# Patient Record
Sex: FEMALE | Race: WHITE | NOT HISPANIC OR LATINO | URBAN - METROPOLITAN AREA
[De-identification: names, ages, dates, MRNs, and addresses within clinical notes are randomized per-mention and may not be internally consistent; named-entity substitution may affect disease eponyms.]

---

## 2024-10-17 ENCOUNTER — EMERGENCY (EMERGENCY)
Facility: HOSPITAL | Age: 44
LOS: 1 days | Discharge: HOME | End: 2024-10-17
Attending: EMERGENCY MEDICINE | Admitting: EMERGENCY MEDICINE
Payer: COMMERCIAL

## 2024-10-17 VITALS
OXYGEN SATURATION: 100 % | DIASTOLIC BLOOD PRESSURE: 80 MMHG | HEART RATE: 63 BPM | TEMPERATURE: 98 F | SYSTOLIC BLOOD PRESSURE: 117 MMHG | RESPIRATION RATE: 18 BRPM

## 2024-10-17 VITALS
DIASTOLIC BLOOD PRESSURE: 68 MMHG | HEART RATE: 72 BPM | SYSTOLIC BLOOD PRESSURE: 118 MMHG | TEMPERATURE: 98 F | RESPIRATION RATE: 16 BRPM | OXYGEN SATURATION: 97 %

## 2024-10-17 DIAGNOSIS — N12 TUBULO-INTERSTITIAL NEPHRITIS, NOT SPECIFIED AS ACUTE OR CHRONIC: ICD-10-CM

## 2024-10-17 DIAGNOSIS — R30.0 DYSURIA: ICD-10-CM

## 2024-10-17 LAB
ALBUMIN SERPL ELPH-MCNC: 3.6 G/DL — SIGNIFICANT CHANGE UP (ref 3.4–5)
ALP SERPL-CCNC: 74 U/L — SIGNIFICANT CHANGE UP (ref 40–120)
ALT FLD-CCNC: 12 U/L — SIGNIFICANT CHANGE UP (ref 12–42)
ANION GAP SERPL CALC-SCNC: 8 MMOL/L — LOW (ref 9–16)
APPEARANCE UR: ABNORMAL
AST SERPL-CCNC: 13 U/L — LOW (ref 15–37)
BACTERIA # UR AUTO: ABNORMAL /HPF
BASOPHILS # BLD AUTO: 0.03 K/UL — SIGNIFICANT CHANGE UP (ref 0–0.2)
BASOPHILS NFR BLD AUTO: 0.3 % — SIGNIFICANT CHANGE UP (ref 0–2)
BILIRUB SERPL-MCNC: 0.2 MG/DL — SIGNIFICANT CHANGE UP (ref 0.2–1.2)
BILIRUB UR-MCNC: NEGATIVE — SIGNIFICANT CHANGE UP
BUN SERPL-MCNC: 18 MG/DL — SIGNIFICANT CHANGE UP (ref 7–23)
CALCIUM SERPL-MCNC: 9.2 MG/DL — SIGNIFICANT CHANGE UP (ref 8.5–10.5)
CHLORIDE SERPL-SCNC: 106 MMOL/L — SIGNIFICANT CHANGE UP (ref 96–108)
CO2 SERPL-SCNC: 24 MMOL/L — SIGNIFICANT CHANGE UP (ref 22–31)
COLOR SPEC: YELLOW — SIGNIFICANT CHANGE UP
CREAT SERPL-MCNC: 1.1 MG/DL — SIGNIFICANT CHANGE UP (ref 0.5–1.3)
DIFF PNL FLD: ABNORMAL
EGFR: 64 ML/MIN/1.73M2 — SIGNIFICANT CHANGE UP
EOSINOPHIL # BLD AUTO: 0.08 K/UL — SIGNIFICANT CHANGE UP (ref 0–0.5)
EOSINOPHIL NFR BLD AUTO: 0.8 % — SIGNIFICANT CHANGE UP (ref 0–6)
EPI CELLS # UR: PRESENT
GLUCOSE SERPL-MCNC: 104 MG/DL — HIGH (ref 70–99)
GLUCOSE UR QL: NEGATIVE MG/DL — SIGNIFICANT CHANGE UP
HCG UR QL: NEGATIVE — SIGNIFICANT CHANGE UP
HCT VFR BLD CALC: 39.7 % — SIGNIFICANT CHANGE UP (ref 34.5–45)
HGB BLD-MCNC: 13.1 G/DL — SIGNIFICANT CHANGE UP (ref 11.5–15.5)
IMM GRANULOCYTES NFR BLD AUTO: 0.5 % — SIGNIFICANT CHANGE UP (ref 0–0.9)
KETONES UR-MCNC: NEGATIVE MG/DL — SIGNIFICANT CHANGE UP
LEUKOCYTE ESTERASE UR-ACNC: ABNORMAL
LYMPHOCYTES # BLD AUTO: 2.34 K/UL — SIGNIFICANT CHANGE UP (ref 1–3.3)
LYMPHOCYTES # BLD AUTO: 23.8 % — SIGNIFICANT CHANGE UP (ref 13–44)
MCHC RBC-ENTMCNC: 32.5 PG — SIGNIFICANT CHANGE UP (ref 27–34)
MCHC RBC-ENTMCNC: 33 GM/DL — SIGNIFICANT CHANGE UP (ref 32–36)
MCV RBC AUTO: 98.5 FL — SIGNIFICANT CHANGE UP (ref 80–100)
MONOCYTES # BLD AUTO: 1.11 K/UL — HIGH (ref 0–0.9)
MONOCYTES NFR BLD AUTO: 11.3 % — SIGNIFICANT CHANGE UP (ref 2–14)
NEUTROPHILS # BLD AUTO: 6.21 K/UL — SIGNIFICANT CHANGE UP (ref 1.8–7.4)
NEUTROPHILS NFR BLD AUTO: 63.3 % — SIGNIFICANT CHANGE UP (ref 43–77)
NITRITE UR-MCNC: NEGATIVE — SIGNIFICANT CHANGE UP
NRBC # BLD: 0 /100 WBCS — SIGNIFICANT CHANGE UP (ref 0–0)
PH UR: 7 — SIGNIFICANT CHANGE UP (ref 5–8)
PLATELET # BLD AUTO: 158 K/UL — SIGNIFICANT CHANGE UP (ref 150–400)
POTASSIUM SERPL-MCNC: 4 MMOL/L — SIGNIFICANT CHANGE UP (ref 3.5–5.3)
POTASSIUM SERPL-SCNC: 4 MMOL/L — SIGNIFICANT CHANGE UP (ref 3.5–5.3)
PROT SERPL-MCNC: 7.4 G/DL — SIGNIFICANT CHANGE UP (ref 6.4–8.2)
PROT UR-MCNC: ABNORMAL MG/DL
RBC # BLD: 4.03 M/UL — SIGNIFICANT CHANGE UP (ref 3.8–5.2)
RBC # FLD: 12.7 % — SIGNIFICANT CHANGE UP (ref 10.3–14.5)
RBC CASTS # UR COMP ASSIST: >100 /HPF — HIGH (ref 0–4)
SODIUM SERPL-SCNC: 138 MMOL/L — SIGNIFICANT CHANGE UP (ref 132–145)
SP GR SPEC: 1.02 — SIGNIFICANT CHANGE UP (ref 1–1.03)
UROBILINOGEN FLD QL: 1 MG/DL — SIGNIFICANT CHANGE UP (ref 0.2–1)
WBC # BLD: 9.82 K/UL — SIGNIFICANT CHANGE UP (ref 3.8–10.5)
WBC # FLD AUTO: 9.82 K/UL — SIGNIFICANT CHANGE UP (ref 3.8–10.5)
WBC UR QL: 2 /HPF — SIGNIFICANT CHANGE UP (ref 0–5)

## 2024-10-17 PROCEDURE — 99285 EMERGENCY DEPT VISIT HI MDM: CPT

## 2024-10-17 PROCEDURE — 74177 CT ABD & PELVIS W/CONTRAST: CPT | Mod: 26,MC

## 2024-10-17 RX ORDER — CEFPODOXIME PROXETIL 50 MG/5 ML
1 SUSPENSION, RECONSTITUTED, ORAL (ML) ORAL
Qty: 14 | Refills: 0
Start: 2024-10-17 | End: 2024-10-23

## 2024-10-17 RX ORDER — KETOROLAC TROMETHAMINE 10 MG/1
15 TABLET, FILM COATED ORAL ONCE
Refills: 0 | Status: DISCONTINUED | OUTPATIENT
Start: 2024-10-17 | End: 2024-10-17

## 2024-10-17 RX ORDER — SODIUM CHLORIDE 0.9 % (FLUSH) 0.9 %
1000 SYRINGE (ML) INJECTION ONCE
Refills: 0 | Status: COMPLETED | OUTPATIENT
Start: 2024-10-17 | End: 2024-10-17

## 2024-10-17 RX ORDER — ACETAMINOPHEN 325 MG
975 TABLET ORAL ONCE
Refills: 0 | Status: COMPLETED | OUTPATIENT
Start: 2024-10-17 | End: 2024-10-17

## 2024-10-17 RX ADMIN — Medication 1000 MILLILITER(S): at 20:01

## 2024-10-17 RX ADMIN — Medication 975 MILLIGRAM(S): at 20:00

## 2024-10-17 RX ADMIN — Medication 975 MILLIGRAM(S): at 21:00

## 2024-10-17 RX ADMIN — KETOROLAC TROMETHAMINE 15 MILLIGRAM(S): 10 TABLET, FILM COATED ORAL at 20:00

## 2024-10-17 RX ADMIN — KETOROLAC TROMETHAMINE 15 MILLIGRAM(S): 10 TABLET, FILM COATED ORAL at 21:00

## 2024-10-17 RX ADMIN — Medication 1000 MILLILITER(S): at 20:39

## 2024-10-17 NOTE — ED ADULT TRIAGE NOTE - CHIEF COMPLAINT QUOTE
Pt states dysuria since Monday with increased lower back pain today. Pt was placed on keflex and has taken 2 days worth. denies fevers.

## 2024-10-17 NOTE — ED PROVIDER NOTE - NS ED ROS FT
General: denies fever, chills  CV: denies chest pain, palpitations  Resp: denies difficulty breathing, cough  Abdominal: denies nausea, vomiting, diarrhea, abdominal pain  : +urinary pain , b/l flank pain denies vaginal discharge  MSK: denies muscle aches, leg swelling  Neuro: denies headaches,

## 2024-10-17 NOTE — ED PROVIDER NOTE - CLINICAL SUMMARY MEDICAL DECISION MAKING FREE TEXT BOX
no pmhx p/w two weeks of dysuria. patient started w dysuria, and over the past week has noticed b/l flank pain. denies any fevers/chills, chest pain, sob, n/v, diarrhea, vaginal discharge, skin lesions. went to UC had 75 wbc in UA,  was placed on cephalexin which she took two days worth. of note has had pyelo in the past ten years ago, says feels similar, which she was hospitalized for ten days. on arrival pt normotensive HR 73 afebrile orally, very well appearing, no suprapubic tenderness b/l flank pain, high suspicion for complicated cystitis, do not think pt has pyleo/ nephrolithiasis given constellation of sx, and pt clinically well appearing, will get basic labs, check UA UC give fluids, tylenol, toradol and reassess. if pt pain persists will get ct to evaluate for other pathology.

## 2024-10-17 NOTE — ED PROVIDER NOTE - NSFOLLOWUPINSTRUCTIONS_ED_ALL_ED_FT
You were seen in the ED for dysuria. Your CT showed XXX.    Take antibiotics as instructed.    Follow up with your Primary Doctor within one week.    Return with any worsening pain, fevers/chills or any other concerning symptoms. You were seen in the ED for dysuria. Your CT showed XXX.    Take antibiotics as instructed.    Follow up with your Primary Doctor within one week.    Return with any worsening pain, fevers/chills or any other concerning symptoms.    Pyelonephritis, Adult  Body outline showing the urinary system, with a close-up of a normal kidney and an infected kidney.  Pyelonephritis is an infection that occurs in the kidney. The kidneys are the organs that filter the blood and move waste from the bloodstream to the urine. Urine passes out of the kidneys through tubes called ureters and goes into the bladder. There are two main types of this condition:  Acute pyelonephritis. These are infections that come on quickly and without any warning.  Chronic pyelonephritis. These infections last for a long time.  In most cases, the infection clears up with treatment. In more severe cases, an infection can spread to the bloodstream or lead to other problems with the kidneys.    What are the causes?  This condition is often caused by bacteria. The bacteria may travel:  From the bladder up to the kidney. This may happen after you have a bladder infection (cystitis) or urinary tract infection (UTI).  From the bloodstream to the kidney.  What increases the risk?  You are more likely to develop this condition if:  You are female. Your risk is even higher if you are pregnant.  You are older.  You have:  Diabetes.  Prostatitis. This is inflammation of the prostate gland.  Kidney stones or bladder stones.  Problems with your kidneys or ureters.  Cancer.  Spinal cord injury or nerve damage around the bladder.  You have a soft tube (catheter) placed in your bladder.  You are sexually active and use spermicides.  You have or have had a UTI.  What are the signs or symptoms?  Symptoms of this condition include:  An urge to urinate that is strong or does not go away. You may also urinate more often than normal.  A burning or stinging feeling when you urinate.  Pain. This may be in your abdomen, back, side, or groin.  Fever or chills.  Nausea or vomiting.  Urine that is bloody, dark, cloudy, or smells bad.  How is this diagnosed?  This condition may be diagnosed based on your medical history and a physical exam. You may also have tests, such as:  Urine tests.  Blood tests.  Imaging tests of the kidneys. These may include an ultrasound or CT scan.  How is this treated?  Treatment for this condition depends on the severity of the infection.  If the infection is mild and found early, you may be given antibiotics to take by mouth. You will need to drink lots of fluids.  If the infection is more severe, you may need to stay in the hospital and receive antibiotics through an IV. You may also get fluids through an IV. After you leave the hospital, you may need to take antibiotics by mouth.  Other treatments may be needed. These will depend on the cause of the infection.    Follow these instructions at home:  Eating and drinking    Drink enough fluid to keep your urine pale yellow.  Avoid caffeine, tea, and carbonated drinks. These can irritate the bladder.  General instructions    Take over-the-counter and prescription medicines as told by your health care provider. Finish your antibiotics even if you start to feel better.  Urinate often. Avoid holding in urine for long periods of time.  Urinate before and after sex.  If you are female, cleanse from front to back after a bowel movement. Use each tissue only once.  Keep all follow-up visits. Your health care provider will want to make sure your infection is gone.  Contact a health care provider if:  Your symptoms do not get better after 2 days of treatment.  Your symptoms get worse.  You have a fever or chills.  You cannot take your antibiotics.  Get help right away if:  You vomit each time that you eat or drink.  You have severe pain in your back or side.  You are very weak, or you faint.  This information is not intended to replace advice given to you by your health care provider. Make sure you discuss any questions you have with your health care provider.    Document Revised: 07/10/2023 Document Reviewed: 07/10/2023  [email or web address] Patient Education © 2024 [email or web address]  [email or web address] logo  Terms and Conditions  Privacy Policy  Editorial Policy  All content on this site: Copyright © 2024 [email or web address], its licensors, and contributors. All rights are reserved, including those for text and data mining, AI training, and similar technologies. For all open access content, the Creative Commons licensing terms apply.  Cookies are used by this site. To decline or learn more, visit our Cookies page.

## 2024-10-17 NOTE — ED PROVIDER NOTE - PROGRESS NOTE DETAILS
Patient received hydration and toradol feels better.  She did have improvement with keflex but abx changed to cefpodoxime.  Pt on CT b/l pyeloneph.  Pt aware must f/u with results with primary when returning home to [place] in 3-4 days.  She is aware to hydrate well. Creatine normal.  Pt given copy of results.  pt given opportunity to ask questions.  All questions answered.

## 2024-10-17 NOTE — ED PROVIDER NOTE - ATTENDING CONTRIBUTION TO CARE
Please see progress note for attending note.    Pt much improved.  She is currently being d/c with dx of pyelonephritis.  She will f/u in [place].  Abx changed as CT demonstrated b/l pyelo.  Pt aware to hydrate.  Creatine normal.

## 2024-10-17 NOTE — ED PROVIDER NOTE - NS ED ATTENDING STATEMENT MOD
I have seen and examined this patient and fully participated in the care of this patient as the teaching attending.  The service was shared with the SHANDA.  I reviewed and verified the documentation.

## 2024-10-17 NOTE — ED ADULT NURSE NOTE - OBJECTIVE STATEMENT
"Pt states dysuria since Monday with increased lower back pain today. Pt was placed on keflex and has taken 2 days worth. denies fevers."

## 2024-10-17 NOTE — ED PROVIDER NOTE - PHYSICAL EXAMINATION
GENERAL: well appearing in no acute distress, non-toxic appearing  CARDIAC: regular rate and rhythm, normal S1S2, no appreciable murmurs, 2+ pulses in UE/LE b/l  PULM: normal breath sounds, clear to ascultation bilaterally, no rales, rhonchi, wheezing  GI: abdomen nondistended, soft, nontender, no guarding, rebound tenderness  : +CVA tenderness b/l, no suprapubic tenderness  SKIN: well-perfused, extremities warm, no visible rashes  PSYCH: appropriate mood and affect

## 2024-10-17 NOTE — ED PROVIDER NOTE - PATIENT PORTAL LINK FT
You can access the FollowMyHealth Patient Portal offered by [facility] by registering at the following website: [email or web address]. By joining [facility]’s FollowMyHealth portal, you will also be able to view your health information using other applications (apps) compatible with our system.

## 2024-10-18 LAB
CULTURE RESULTS: NO GROWTH — SIGNIFICANT CHANGE UP
SPECIMEN SOURCE: SIGNIFICANT CHANGE UP